# Patient Record
Sex: FEMALE | Race: WHITE
[De-identification: names, ages, dates, MRNs, and addresses within clinical notes are randomized per-mention and may not be internally consistent; named-entity substitution may affect disease eponyms.]

---

## 2017-09-29 ENCOUNTER — HOSPITAL ENCOUNTER (EMERGENCY)
Dept: HOSPITAL 25 - UCEAST | Age: 37
Discharge: HOME | End: 2017-09-29
Payer: COMMERCIAL

## 2017-09-29 VITALS — DIASTOLIC BLOOD PRESSURE: 86 MMHG | SYSTOLIC BLOOD PRESSURE: 140 MMHG
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
## 2017-09-29 DIAGNOSIS — S92.912A: Primary | ICD-10-CM
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
## 2017-09-29 PROCEDURE — G0463 HOSPITAL OUTPT CLINIC VISIT: HCPCS

## 2017-09-29 PROCEDURE — 99213 OFFICE O/P EST LOW 20 MIN: CPT

## 2017-09-29 NOTE — RAD
INDICATION: Left toe injury     



COMPARISON: None



TECHNIQUE: AP, lateral, and oblique views of the foot were obtained.



FINDINGS: There is a minimally distracted and minimally angulated oblique fracture of the

diaphysis of the proximal falx of the fourth toe. There are no other fractures. There is

mild soft tissue swelling.



IMPRESSION: FRACTURE FOURTH TOE AS DESCRIBED

## 2017-09-29 NOTE — UC
Lower Extremity/Ankle HPI





- HPI Summary


HPI Summary: 


twisted left ankle last night body weight landing on toes  pain in 4th toe








- History of Current Complaint


Chief Complaint: UCLowerExtremity


Stated Complaint: TOE INJURY


Time Seen by Provider: 09/29/17 10:57


Hx Obtained From: Patient


Hx Last Menstrual Period: 9/27/17


Pregnant?: No


Onset/Duration: Sudden Onset, Lasting Days - 1, Still Present


Severity Initially: Moderate


Severity Currently: Moderate


Pain Intensity: 5


Pain Scale Used: 0-10 Numeric


Aggravating Factor(s): Standing, Ambulation


Alleviating Factor(s): Rest, Elevation


Able to Bear Weight: Yes - with pain and limp





- Allergies/Home Medications


Allergies/Adverse Reactions: 


 Allergies











Allergy/AdvReac Type Severity Reaction Status Date / Time


 


No Known Drug Allergy Allergy  See Comment Verified 09/29/17 09:39














PMH/Surg Hx/FS Hx/Imm Hx


Previously Healthy: Yes





- Surgical History


Surgical History: Yes


Surgery Procedure, Year, and Place: mole removed





- Family History


Known Family History: Positive: None





- Social History


Occupation: Employed Full-time


Lives: With Family


Alcohol Use: Weekly


Substance Use Type: None


Smoking Status (MU): Never Smoked Tobacco


Have You Smoked in the Last Year: No





- Immunization History


Most Recent Influenza Vaccination: will consider this season


Most Recent Tetanus Shot: 3/18/15


Most Recent Pneumonia Vaccination: n/a





Review of Systems


Constitutional: Negative


Skin: Negative


Eyes: Negative


ENT: Negative


Respiratory: Negative


Cardiovascular: Negative


Gastrointestinal: Negative


Genitourinary: Negative


Motor: Negative


Neurovascular: Negative


Musculoskeletal: Negative, Arthralgia - left 3/4/5 toes


Neurological: Negative


Psychological: Negative


Is Patient Immunocompromised?: No


All Other Systems Reviewed And Are Negative: Yes





Physical Exam


Triage Information Reviewed: Yes


Appearance: Well-Appearing, Well-Nourished, Pain Distress


Vital Signs: 


 Initial Vital Signs











Temp  98.2 F   09/29/17 09:39


 


Pulse  72   09/29/17 09:39


 


Resp  16   09/29/17 09:39


 


BP  140/86   09/29/17 09:39


 


Pulse Ox  100   09/29/17 09:39











Vital Signs Reviewed: Yes


Eye Exam: Normal


Eyes: Positive: Conjunctiva Clear


ENT Exam: Normal


ENT: Positive: Normal ENT inspection, Hearing grossly normal.  Negative: Nasal 

congestion, Nasal drainage, Trismus, Muffled/hoarse voice


Dental Exam: Normal


Neck exam: Normal


Neck: Positive: Supple, Nontender


Respiratory Exam: Normal


Respiratory: Positive: No respiratory distress, No accessory muscle use


Cardiovascular Exam: Normal


Cardiovascular: Positive: RRR, Pulses Normal, Brisk Capillary Refill


Musculoskeletal Exam: Normal


Musculoskeletal: Positive: Strength Intact, ROM Intact, Edema @ - 3/4 distal 

metatarsal


Neurological Exam: Normal


Neurological: Positive: Alert, Muscle Tone Normal


Psychological Exam: Normal


Skin Exam: Normal





Diagnostics





- Radiology


  ** No standard instances


Xray Interpretation: Positive (See Comments) - minimally displaced oblique 4th 

phlange fracture


Radiology Interpretation Completed By: ED Physician, Radiologist





Lower Extremity Course/Dx





- Course


Course Of Treatment: zenia tape, post op shoe, crutches, rice, pain med follow 

with ortho mid next week





- Differential Dx/Diagnosis


Differential Diagnosis/HQI/PQRI: Contusion, Dislocation, Fracture (Closed), 

Sprain, Strain


Provider Diagnoses: left 4th toe minimally displaced oblique fracture





Discharge





- Discharge Plan


Condition: Stable


Disposition: HOME


Prescriptions: 


Hydrocodone-Acetaminophen [Hydrocodone/Acetaminophen 5-325 mg] 1 tab PO Q6HR 

PRN #16 tab MDD 4


 PRN Reason: Pain


Ibuprofen TAB* [Motrin TAB* 600 MG] 600 mg PO Q6H PRN #40 tab


 PRN Reason: Pain


Patient Education Materials:  Crutch Instructions (ED), Toe Fracture (ED), RICE 

Therapy (ED)


Referrals: 


[facility] [Provider Group] - 4 Days

## 2018-12-09 ENCOUNTER — HOSPITAL ENCOUNTER (EMERGENCY)
Dept: HOSPITAL 25 - UCEAST | Age: 38
Discharge: HOME | End: 2018-12-09
Payer: COMMERCIAL

## 2018-12-09 VITALS — DIASTOLIC BLOOD PRESSURE: 96 MMHG | SYSTOLIC BLOOD PRESSURE: 147 MMHG

## 2018-12-09 DIAGNOSIS — R03.0: ICD-10-CM
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
## 2018-12-09 DIAGNOSIS — S62.232A: Primary | ICD-10-CM

## 2018-12-09 PROCEDURE — 99213 OFFICE O/P EST LOW 20 MIN: CPT

## 2018-12-09 PROCEDURE — G0463 HOSPITAL OUTPT CLINIC VISIT: HCPCS

## 2018-12-09 NOTE — UC
Hand/Wrist HPI





- HPI Summary


HPI Summary: 





39 y/o female presents to the urgent care c/o left hand pain s/p slipping on 

ice and falling on the outstretched hand around midnight. Pt report she was 

taking her dog to walk and she slipped on her icy porch. This morning the base 

of her thumb is swollen w/ a bruise and decrease ROM. Pain w/ movement is sharp 

8/10. She applied ice, but has not taking any medications for pain. Pt denies 

wrist pain, numbness or tingling sensation over the hand, SOB, chest pain, 

abdominal pain, N/V/D. LMP:11/27/2018 and declines pregnancy test. 








- History Of Current Complaint


Chief Complaint: UCUpperExtremity


Stated Complaint: HAND INJURY


Time Seen by Provider: 12/09/18 09:50


Hx Obtained From: Patient


Hx Last Menstrual Period: 11/9/18


Onset/Duration: Sudden Onset, Lasting Hours - 10 hrs, Still Present


Severity Initially: Moderate


Severity Currently: Moderate


Pain Intensity: 8


Pain Scale Used: 0-10 Numeric


Character Of Pain: Sharp


Aggravating Factor(s): Movement, Flexion, Pulling


Alleviating Factor(s): Rest, Ice


Associated Signs And Symptoms: Positive: Swelling, Bruising.  Negative: Numbness

/Tingling


Related History: Dominant Hand Right





- Allergies/Home Medications


Allergies/Adverse Reactions: 


 Allergies











Allergy/AdvReac Type Severity Reaction Status Date / Time


 


No Known Allergies Allergy   Verified 12/09/18 09:45














PMH/Surg Hx/FS Hx/Imm Hx


Previously Healthy: Yes - Pt denies PMHX





- Surgical History


Surgical History: Yes


Surgery Procedure, Year, and Place: mole removed





- Family History


Known Family History: Positive: None - Pt denies FMHX





- Social History


Occupation: Employed Full-time


Lives: With Family


Alcohol Use: Occasionally


Substance Use Type: None


Smoking Status (MU): Never Smoked Tobacco


Have You Smoked in the Last Year: No





- Immunization History


Most Recent Influenza Vaccination: will consider this season


Most Recent Tetanus Shot: 3/18/15


Most Recent Pneumonia Vaccination: n/a





Review of Systems


All Other Systems Reviewed And Are Negative: Yes


Constitutional: Positive: Negative


Skin: Positive: Bruising - Base of Rt thumb w/ swelling


Eyes: Positive: Negative


ENT: Positive: Negative


Respiratory: Positive: Negative


Cardiovascular: Positive: Negative


Gastrointestinal: Positive: Negative


Genitourinary: Positive: Negative


Motor: Positive: Negative


Neurovascular: Positive: Negative


Musculoskeletal: Positive: Decreased ROM - RT thumb, Other: - Rt thumb pain and 

Rt hand pain s/p fall


Neurological: Positive: Negative


Psychological: Positive: Negative


Is Patient Immunocompromised?: No





Physical Exam





- Summary


Physical Exam Summary: 





Vital Signs Reviewed: Yes


General: Well developed well nourished obese female sitting in the examining 

table w/o any apparent distress


Eyes: Positive: Conjunctiva Clear - PERRLA, EOMI


ENT: Positive: Normal ENT inspection, Hearing grossly normal, Pharynx normal, 

TMs normal


Neck: Positive: Supple, Nontender, No Lymphadenopathy


Respiratory: Positive: Chest non-tender, Lungs clear, Normal breath sounds, No 

respiratory distress


Cardiovascular: Positive: RRR, No Murmur, Pulses Normal, Brisk Capillary Refill


Abdomen Description: Positive: Nontender, No Organomegaly, Soft.  Negative: CVA 

Tenderness (R), CVA Tenderness (L)


Bowel Sounds: Positive: Present


Musculoskeletal: Positive: Strength Intact, No Edema, RT Hand/Fingers: the R 

hand is without obvious asymmetry or deformity when compared to the L hand.  

mild swelling around the base of the RT first MCPJ and thena eminence w/ 

bruising and echymisis, no erythema, atrophy, or obvious deformity.  No surface 

trauma, open wounds,bony deformity.  Normal cascade of fingers.  Normal flexion 

and extension of fingers, except for thumb due to pain.  FDS and FDP intact 

against resistance.  No focal fullness, throbbing pain, swelling of finger tip.

    Pulses and capillary refill WNL, positive reflexes and sensation intact


Neurological Exam: Normal


Psychological Exam: Normal


Skin Exam: Normal





Triage Information Reviewed: Yes


Vital Signs: 


 Initial Vital Signs











Temp  98 F   12/09/18 09:48


 


Pulse  92   12/09/18 09:48


 


Resp  17   12/09/18 09:48


 


BP  147/96   12/09/18 09:48


 


Pulse Ox  100   12/09/18 09:48














Hand/Wrist Course/Dx





- Course


Course Of Treatment: 39 y/o female presents to the urgent care c/o left hand 

pain s/p slipping on ice and falling on the outstretched hand around midnight. 

Pt report she was taking her dog to walk and she slipped on her icy porch. This 

morning the base of her thumb is swollen w/ a bruise and decrease ROM. Pain w/ 

movement is sharp 8/10. She applied ice, but has not taking any medications for 

pain. Pt denies wrist pain, numbness or tingling sensation over the hand, SOB, 

chest pain, abdominal pain, N/V/D. LMP:11/27/2018 and declines pregnancy test. 

Hx obtained. RT hand X-ray ordered. Impression: Impacted displaced mildly 

comminuted fracture at the base of the first metacarpal, no dislocation, 

sorrounding soft tissue swelling observed as per radiologist. Pt's symptoms 

discussed w/ Dr Lopez and he recommended a thumb spica splint and F/u w/ 

orthopedic.  Pts hand immobilized with  thumb spica splint. Advised RICE: Rest

, Ice, elevation, Rx Ibuprofen PO. There was no neurovascular compromise after 

splint application placed by nurse; the splint was in good alignment and the pt 

had good sensation and capillary refill at the time of discharge.Pt strongly 

advised to f/u w/ Orthopedic DR Chapa as soon as possible for further 

management.Pt's BP is elevated today advised to decrease salt in diet, monitor 

BP and f/u with PCP for further management.   Pt understood and agreed w/ plan 

of care.





- Differential Dx/Diagnosis


Differential Diagnosis/HQI/PQRI: Contusion, Fracture, Sprain, Strain, Tendonitis


Provider Diagnosis: 


 Closed left hand fracture, Left hand pain, Elevated BP without diagnosis of 

hypertension








- Physician Notifications


Discussed Patient Care With: Vitaliy Lopez - DR Lopez agreed w/ pt's plan 

of care.





Discharge





- Sign-Out/Discharge


Documenting (check all that apply): Patient Departure - d/c home


All imaging exams completed and their final reports reviewed: Yes





- Discharge Plan


Condition: Stable


Disposition: HOME


Prescriptions: 


Ibuprofen TAB* [Motrin TAB* 800 MG] 800 mg PO Q6H PRN #30 tab


 PRN Reason: Pain


Patient Education Materials:  Hand Fracture (ED), Low-Sodium Diet (ED)


Forms:  *Work Release


Referrals: 


[facility] [place] [facility] [Outside] - 3 Days


Elvia Chapa MD [Medical Doctor] - 1 Day


Additional Instructions: 


1-Please take Ibuprofen PO q6-8hrs after meals  as directed to alleviate pain 

and swelling.


2-Please apply ice, keep your hand immobilized with the thumb spica splint.


3- Please f/u with Orthopedic DR Chapa tomorrow  for further evaluation and 

treatment.


4- Your BP is elevated today. please decrease salt in your diet, monitor BP and 

if it continues to be elevated please f/u with your PCP for further management











- Billing Disposition and Condition


Condition: STABLE


Disposition: Home

## 2018-12-18 ENCOUNTER — HOSPITAL ENCOUNTER (OUTPATIENT)
Dept: HOSPITAL 25 - OREAST | Age: 38
Discharge: HOME | End: 2018-12-18
Attending: ORTHOPAEDIC SURGERY
Payer: COMMERCIAL

## 2018-12-18 VITALS — SYSTOLIC BLOOD PRESSURE: 135 MMHG | DIASTOLIC BLOOD PRESSURE: 85 MMHG
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
## 2018-12-18 DIAGNOSIS — S62.232A: Primary | ICD-10-CM
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
## 2018-12-18 PROCEDURE — 81025 URINE PREGNANCY TEST: CPT

## 2018-12-18 PROCEDURE — C1776 JOINT DEVICE (IMPLANTABLE): HCPCS

## 2018-12-18 PROCEDURE — 76000 FLUOROSCOPY <1 HR PHYS/QHP: CPT

## 2018-12-18 NOTE — OP
DATE OF OPERATION:  12/18/18 [facility]

 

DATE OF BIRTH:  [birth year]

 

SURGEON:  Teodora Will MD

 

ASSISTANT:  EDMUNDO Coelho.

 

ANESTHESIA:  General.

 

PRE-OP DIAGNOSIS:  Left thumb metacarpal proximal fracture.

 

POST-OP DIAGNOSIS:  Left thumb metacarpal proximal fracture.

 

OPERATIVE PROCEDURE:  Closed reduction and pinning, left thumb metacarpal.

 

ESTIMATED BLOOD LOSS:  Zero.

 

INDICATIONS FOR PROCEDURE:  Lilly is a 38-year-old female who suffered a 
fracture of her left thumb metacarpal at the base just extraarticular, 
significantly displaced.  She presents for closed reduction and pinning.

 

DESCRIPTION OF PROCEDURE:  The patient was brought to the operating room, was 
given a general anesthetic, and placed in the supine position on the operating 
table with a tourniquet around her left upper extremity.  The tourniquet was 
not used during the procedure.  The skin of her left upper extremity was 
prepped and draped in the usual sterile fashion.  The fracture was reduced with 
manipulation and traction and then two 0.045 inch K-wires were driven, one 
through the base of the distal fragment and into the proximal fragment and the 
other through the distal fragment and into the trapezium across the CMC joint.  
The position of the hardware and fracture fragments were checked on the C-arm 
in the AP and lateral views and found to be satisfactory.  The pins were bent 
and cut and dressed with Xeroform, 4x4, Webril, and an Ace wrap with a thumb 
spica splint.  The patient tolerated the procedure well and was brought to the 
recovery room in good condition.

 

 [id number]/[facility] #: [id number]

[facility] HospitalMARGY
33